# Patient Record
Sex: FEMALE | Race: WHITE | Employment: FULL TIME | URBAN - METROPOLITAN AREA
[De-identification: names, ages, dates, MRNs, and addresses within clinical notes are randomized per-mention and may not be internally consistent; named-entity substitution may affect disease eponyms.]

---

## 2018-10-28 ENCOUNTER — OFFICE VISIT (OUTPATIENT)
Dept: URGENT CARE | Facility: URGENT CARE | Age: 28
End: 2018-10-28
Payer: COMMERCIAL

## 2018-10-28 VITALS
SYSTOLIC BLOOD PRESSURE: 143 MMHG | WEIGHT: 207 LBS | BODY MASS INDEX: 31.57 KG/M2 | DIASTOLIC BLOOD PRESSURE: 88 MMHG | TEMPERATURE: 96.5 F | OXYGEN SATURATION: 98 % | HEART RATE: 54 BPM

## 2018-10-28 DIAGNOSIS — L60.0 INGROWN THUMB NAIL, LEFT: Primary | ICD-10-CM

## 2018-10-28 PROCEDURE — 99214 OFFICE O/P EST MOD 30 MIN: CPT | Performed by: PHYSICIAN ASSISTANT

## 2018-10-28 RX ORDER — DOXYCYCLINE 100 MG/1
100 CAPSULE ORAL 2 TIMES DAILY
Qty: 14 CAPSULE | Refills: 0 | Status: SHIPPED | OUTPATIENT
Start: 2018-10-28 | End: 2018-11-04

## 2018-10-28 NOTE — PROGRESS NOTES
SUBJECTIVE:  Merlyn Sullivan is a 28 year old female who presents to the clinic today for a rash. Hangnail broke back and she pulled it out one week ago. Some pus coming out of the thumb just recently today after soaking. Still some pressure now.   Onset of rash was 1 week(s) ago.   Rash is sudden onset and still present.  Location of the rash: left thumb.  Quality/symptoms of rash: painful and red   Symptoms are mild and rash seems to be stable.  Previous history of a similar rash? No  Recent exposure history: none known    Associated symptoms include: nothing.    Past Medical History:   Diagnosis Date     ADD (attention deficit disorder)     with hyperactivity     Anxiety      Candidiasis of vulva and vagina      Depression with anxiety      Sprain of deltoid (ligament), ankle      Current Outpatient Prescriptions   Medication Sig Dispense Refill     PARoxetine (PAXIL) 40 MG tablet Take by mouth every morning       UNKNOWN TO PATIENT Birth Control       atomoxetine (STRATTERA) 60 MG capsule Take 60 mg by mouth daily       mupirocin (BACTROBAN) 2 % ointment Apply three times daily to affected areas until cleared (Patient not taking: Reported on 10/28/2018) 22 g 1     NEW MED Birth control pill but unsure of name.       Social History   Substance Use Topics     Smoking status: Former Smoker     Smokeless tobacco: Never Used     Alcohol use Yes      Comment: occasionial       ROS:  CONSTITUTIONAL:NEGATIVE for fever, chills, change in weight  INTEGUMENTARY/SKIN: rash left thumb    EXAM:   /88  Pulse 54  Temp 96.5  F (35.8  C) (Tympanic)  Wt 207 lb (93.9 kg)  SpO2 98%  BMI 31.57 kg/m2  GENERAL: alert, no acute distress.  SKIN: Rash description:    Distribution: localized  Location: left thumb laterally    Color: pink with mild tenderness and minimal edema. No pus pocket,  Lesion type: macular, blotchy with mild tenderness  GENERAL APPEARANCE: healthy, alert and no distress    ASSESSMENT:    1. Ingrown thumb  nail, left      PLAN: warm soaks with doxycyline. If not improving or worsening follow up for possible Incision.   1) See today's orders.  2) Follow-up with primary clinic if not improving

## 2018-10-28 NOTE — MR AVS SNAPSHOT
"              After Visit Summary   10/28/2018    Merlyn Sullivan    MRN: [id number]           Patient Information     Date Of Birth          1990        Visit Information        Provider Department      10/28/2018 5:55 PM Brad Forrest PA-C [facility]        Today's Diagnoses     Ingrown thumb nail, left    -  1       Follow-ups after your visit        Who to contact     If you have questions or need follow up information about today's clinic visit or your schedule please contact [facility] directly at [phone].  Normal or non-critical lab and imaging results will be communicated to you by [employer], letter or phone within 4 business days after the clinic has received the results. If you do not hear from us within 7 days, please contact the clinic through [employer] or phone. If you have a critical or abnormal lab result, we will notify you by phone as soon as possible.  Submit refill requests through [employer] or call your pharmacy and they will forward the refill request to us. Please allow 3 business days for your refill to be completed.          Additional Information About Your Visit        MyChart Information     [employer] lets you send messages to your doctor, view your test results, renew your prescriptions, schedule appointments and more. To sign up, go to [email or web address] . Click on \"Log in\" on the left side of the screen, which will take you to the Welcome page. Then click on \"Sign up Now\" on the right side of the page.     You will be asked to enter the access code listed below, as well as some personal information. Please follow the directions to create your username and password.     Your access code is: [email or web address]  Expires: 2019  7:45 PM     Your access code will  in 90 days. If you need help or a new code, please call your [facility] clinic or [phone].        Care EveryWhere ID     This is your Care EveryWhere ID. This could be " used by other organizations to access your [facility] medical records  [id number]        Your Vitals Were     Pulse Temperature Pulse Oximetry BMI (Body Mass Index)          54 96.5  F (35.8  C) (Tympanic) 98% 31.57 kg/m2         Blood Pressure from Last 3 Encounters:   10/28/18 143/88   02/28/14 137/88   08/14/13 114/66    Weight from Last 3 Encounters:   10/28/18 207 lb (93.9 kg)   08/14/13 187 lb 13.3 oz (85.2 kg)              Today, you had the following     No orders found for display         Today's Medication Changes          These changes are accurate as of 10/28/18  7:45 PM.  If you have any questions, ask your nurse or doctor.               Start taking these medicines.        Dose/Directions    doxycycline 100 MG capsule   Commonly known as:  VIBRAMYCIN   Started by:  rBad Forrest PA-C        Dose:  100 mg   Take 1 capsule (100 mg) by mouth 2 times daily for 7 days   Quantity:  14 capsule   Refills:  0            Where to get your medicines      These medications were [facility] to [id number] - [place], [place] - [address], [place] [address]     Phone:  [phone]     doxycycline 100 MG capsule                Primary Care Provider Fax #    Physician No Ref-Primary [phone]       No address on file        Equal Access to Services     AMBIKA RANDOLPH : Hadii aad ku hadasho Soviktoriyaali, waaxda luqadaha, qaybta kaalmada adeegyada, marianne silva. So [facility] [phone].    ATENCIÓN: Si habla español, tiene a gavin disposición servicios gratuitos de asistencia lingüística. Llame al [phone].    We comply with applicable federal civil rights laws and [place] laws. We do not discriminate on the basis of race, color, national origin, age, disability, sex, sexual orientation, or gender identity.            Thank you!     Thank you for choosing [facility]  for your care. Our  goal is always to provide you with excellent care. Hearing back from our patients is one way we can continue to improve our services. Please take a few minutes to complete the written survey that you may receive in the mail after your visit with us. Thank you!             Your Updated Medication List - Protect others around you: Learn how to safely use, store and throw away your medicines at www.disposemymeds.org.          This list is accurate as of 10/28/18  7:45 PM.  Always use your most recent med list.                   Brand Name Dispense Instructions for use Diagnosis    doxycycline 100 MG capsule    VIBRAMYCIN    14 capsule    Take 1 capsule (100 mg) by mouth 2 times daily for 7 days        mupirocin 2 % ointment    BACTROBAN    22 g    Apply three times daily to affected areas until cleared    Impetigo       NEW MED      Birth control pill but unsure of name.        PARoxetine 40 MG tablet    PAXIL     Take by mouth every morning        STRATTERA 60 MG capsule   Generic drug:  atomoxetine      Take 60 mg by mouth daily        UNKNOWN TO PATIENT      Birth Control

## 2021-11-24 ENCOUNTER — HOSPITAL ENCOUNTER (EMERGENCY)
Facility: CLINIC | Age: 31
Discharge: HOME | End: 2021-11-24
Attending: EMERGENCY MEDICINE | Admitting: EMERGENCY MEDICINE
Payer: OTHER MISCELLANEOUS

## 2021-11-24 VITALS
WEIGHT: 190 LBS | OXYGEN SATURATION: 100 % | TEMPERATURE: 98 F | HEART RATE: 75 BPM | RESPIRATION RATE: 18 BRPM | SYSTOLIC BLOOD PRESSURE: 141 MMHG | DIASTOLIC BLOOD PRESSURE: 85 MMHG | BODY MASS INDEX: 28.97 KG/M2
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
## 2021-11-24 DIAGNOSIS — S61.258A DOG BITE OF MIDDLE FINGER, INITIAL ENCOUNTER: ICD-10-CM

## 2021-11-24 PROCEDURE — 250N000009 HC RX 250: Performed by: EMERGENCY MEDICINE

## 2021-11-24 PROCEDURE — 99283 EMERGENCY DEPT VISIT LOW MDM: CPT

## 2021-11-24 PROCEDURE — 250N000013 HC RX MED GY IP 250 OP 250 PS 637: Performed by: EMERGENCY MEDICINE

## 2021-11-24 RX ORDER — GINSENG 100 MG
CAPSULE ORAL ONCE
Status: COMPLETED | OUTPATIENT
Start: 2021-11-24 | End: 2021-11-24

## 2021-11-24 RX ADMIN — AMOXICILLIN AND CLAVULANATE POTASSIUM 1 TABLET: 875; 125 TABLET, FILM COATED ORAL at 02:44

## 2021-11-24 RX ADMIN — BACITRACIN: 500 OINTMENT TOPICAL at 02:42

## 2021-11-24 NOTE — ED TRIAGE NOTES
Pt presents to the ED with c/o dog bite to right middle finger. Pt works with animals. Dog was vaccinated. Pt also vaccinated

## 2021-11-24 NOTE — Clinical Note
Merlyn Sullivan was seen and treated in our emergency department on 11/24/2021.  She may return to work on 11/24/2021.  Keep wound covered clean and dry while at work     If you have any questions or concerns, please don't hesitate to call.      Debra Guevara MD

## 2021-11-24 NOTE — ED PROVIDER NOTES
EMERGENCY DEPARTMENT ENCOUnter      NAME: Merlyn Sullivan  AGE: 31 year old female  YOB: 1990  MRN: [id number]  EVALUATION DATE & TIME: 11/24/2021  1:32 AM    PCP: No Ref-Primary, Physician    ED PROVIDER: Debra Guevara MD      Chief Complaint   Patient presents with     Dog Bite         FINAL IMPRESSION:  1. Dog bite of middle finger, initial encounter          ED COURSE & MEDICAL DECISION MAKING:      In summary, the patient is a 31-year-old female that presents to the emergency department for evaluation of a dog bite of her right long finger sustained at work.  The dog was up-to-date on its rabies.  The patient is up-to-date on her tetanus.  We will treat with prophylactic antibiotics and wound care.  2:20AM I met with the patient for the initial interview and physical examination.  Augmentin 875 mg p.o. was administered for prophylactic antibiotic treatment.  Patient's wound was cleansed and dressed with bacitracin and a Band-Aid discussed plan for treatment and workup in the ED. PPE: Provider wore surgical cap and N95 mask.     At the conclusion of the encounter I discussed the results of all of the tests and the disposition. The questions were answered. The patient or family acknowledged understanding and was agreeable with the care plan.         MEDICATIONS GIVEN IN THE EMERGENCY:  Medications   bacitracin ointment (has no administration in time range)   amoxicillin-clavulanate (AUGMENTIN) 875-125 MG per tablet 1 tablet (has no administration in time range)       NEW PRESCRIPTIONS STARTED AT TODAY'S ER VISIT  New Prescriptions    No medications on file          =================================================================    HPI    Merlyn Sullivan is a 31 year old female with no pertinent history who presents to this ED for evaluation of dog bite.     Patient reports that she was bit by a dog at work around 2345 last night (11/23) and had to come to the ED per her work policy. Dog is  UTD on rabies, patient is UTD on Tdap. The dog bit her right 3rd finger but reports mild pain, currently rating it 2-3/10. Denies numbness, tingling, or weakness. She is right hand dominant. Patient does not have any other associated complaints or concerns at this time. Allergic to Adderall. Denies history of diabetes mellitus or heart/lung problems.     Social: Occasional alcohol use. Denies tobacco use.      REVIEW OF SYSTEMS     Constitutional:  Denies fever or chills  HENT:  Denies sore throat   Respiratory:  Denies cough or shortness of breath   Cardiovascular:  Denies chest pain or palpitations  GI:  Denies abdominal pain, nausea, or vomiting  Musculoskeletal:  Reports pain in right 3rd finger (secondary to dog bite).   Skin:  Denies rash. Reports wound (dog bite; right 3rd finger).  Neurologic:  Denies headache, focal weakness or sensory changes    All other systems reviewed and are negative      PAST MEDICAL HISTORY:  Past Medical History:   Diagnosis Date     ADD (attention deficit disorder)     with hyperactivity     Anxiety      Candidiasis of vulva and vagina      Depression with anxiety      Sprain of deltoid (ligament), ankle        PAST SURGICAL HISTORY:  Past Surgical History:   Procedure Laterality Date     BUNIONECTOMY  10/19/07    (L)     wisdom teeth  2/13/09           CURRENT MEDICATIONS:    atomoxetine (STRATTERA) 60 MG capsule  mupirocin (BACTROBAN) 2 % ointment  NEW MED  PARoxetine (PAXIL) 40 MG tablet  UNKNOWN TO PATIENT        ALLERGIES:  Allergies   Allergen Reactions     Amphetamine-Dextroamphetamine [Adderall]      Ceftin Hives     Using a tanning be while taking this med and broke out in hives     Nickel      Rash and itching       FAMILY HISTORY:  Family History   Problem Relation Age of Onset     Hypertension Mother        SOCIAL HISTORY:   Social History     Socioeconomic History     Marital status: Single     Spouse name: None     Number of children: None     Years of education:  None     Highest education level: None   Occupational History     None   Tobacco Use     Smoking status: Former Smoker     Smokeless tobacco: Never Used   Substance and Sexual Activity     Alcohol use: Yes     Comment: occasionial     Drug use: No     Sexual activity: Yes     Partners: Male   Other Topics Concern     None   Social History Narrative     None     Social Determinants of Health     Financial Resource Strain: Not on file   Food Insecurity: Not on file   Transportation Needs: Not on file   Physical Activity: Not on file   Stress: Not on file   Social Connections: Not on file   Intimate Partner Violence: Not on file   Housing Stability: Not on file       VITALS:  Patient Vitals for the past 24 hrs:   BP Temp Temp src Pulse Resp SpO2 Weight   11/24/21 0043 (!) 141/85 98  F (36.7  C) Oral 75 18 100 % 86.2 kg (190 lb)       PHYSICAL EXAM    Constitutional:  Well developed, Well nourished,  HENT:  Normocephalic, Atraumatic, Bilateral external ears normal, Oropharynx moist, Nose normal.   Neck:  Normal range of motion, No meningismus, No stridor.   Eyes:  EOMI, Conjunctiva normal, No discharge.   Respiratory:  Normal breath sounds, No respiratory distress, No wheezing, No chest tenderness.   Cardiovascular:  Normal heart rate, Normal rhythm, No murmurs  Musculoskeletal:Good range of motion in all major joints.   Integument:  Warm, Dry, No erythema, No rash.  1 cm vertical laceration/bite noted on distal palmar aspect of patient's right long finger  Lymphatic:  No lymphadenopathy noted.   Neurologic:  Alert & oriented x 3, Normal motor function,No focal deficits noted.   Psychiatric:  Affect normal, Judgment normal, Mood normal.        I, Freya De Jesus, am serving as a scribe to document services personally performed by Dr. Guevara based on my observation and the provider's statements to me. I, Debra Guevara MD attest that Freya De Jesus is acting in a scribe capacity, has observed my performance of  the services and has documented them in accordance with my direction.    Debra Guevara MD  Emergency Medicine  [facility] EMERGENCY ROOM  [address] [facility] [id number] [phone]  Dept: [phone]     Debra Guevara MD  11/24/21 0774

## 2021-11-24 NOTE — DISCHARGE INSTRUCTIONS
Please clean the wound twice daily followed by antibiotic ointment like bacitracin, Neosporin or triple antibiotic ointment  Tylenol 650 mg every 4 hours as needed for pain  IbuProfen 600 mg every 6 hours as needed for pain  ReTurn to the emergency department or clinic for any signs of infection